# Patient Record
Sex: MALE | Race: WHITE | NOT HISPANIC OR LATINO | Employment: FULL TIME | URBAN - METROPOLITAN AREA
[De-identification: names, ages, dates, MRNs, and addresses within clinical notes are randomized per-mention and may not be internally consistent; named-entity substitution may affect disease eponyms.]

---

## 2017-01-01 ENCOUNTER — TELEPHONE (OUTPATIENT)
Dept: ORTHOPEDIC SURGERY | Facility: CLINIC | Age: 42
End: 2017-01-01

## 2017-01-01 ENCOUNTER — OFFICE VISIT (OUTPATIENT)
Dept: SPORTS MEDICINE | Facility: CLINIC | Age: 42
End: 2017-01-01

## 2017-01-01 ENCOUNTER — TELEPHONE (OUTPATIENT)
Dept: SPORTS MEDICINE | Facility: CLINIC | Age: 42
End: 2017-01-01

## 2017-01-01 VITALS
SYSTOLIC BLOOD PRESSURE: 120 MMHG | HEART RATE: 70 BPM | OXYGEN SATURATION: 98 % | WEIGHT: 214 LBS | HEIGHT: 72 IN | DIASTOLIC BLOOD PRESSURE: 78 MMHG | BODY MASS INDEX: 28.99 KG/M2

## 2017-01-01 VITALS
HEIGHT: 72 IN | HEART RATE: 78 BPM | SYSTOLIC BLOOD PRESSURE: 124 MMHG | WEIGHT: 214 LBS | BODY MASS INDEX: 28.99 KG/M2 | RESPIRATION RATE: 16 BRPM | DIASTOLIC BLOOD PRESSURE: 90 MMHG | OXYGEN SATURATION: 96 %

## 2017-01-01 DIAGNOSIS — M79.672 LEFT FOOT PAIN: ICD-10-CM

## 2017-01-01 DIAGNOSIS — D69.6 THROMBOCYTOPENIA (HCC): Chronic | ICD-10-CM

## 2017-01-01 DIAGNOSIS — K70.10 ALCOHOLIC HEPATITIS WITHOUT ASCITES: Primary | Chronic | ICD-10-CM

## 2017-01-01 DIAGNOSIS — R73.01 ABNORMAL FASTING GLUCOSE: ICD-10-CM

## 2017-01-01 DIAGNOSIS — F10.20 ALCOHOLISM (HCC): ICD-10-CM

## 2017-01-01 DIAGNOSIS — C49.9 SYNOVIAL SARCOMA (HCC): Primary | ICD-10-CM

## 2017-01-01 LAB
ERYTHROCYTE [DISTWIDTH] IN BLOOD BY AUTOMATED COUNT: 21.3 % (ref 11.5–14.5)
HBA1C MFR BLD: 5.3 % (ref 4.8–5.6)
HCT VFR BLD AUTO: 38.4 % (ref 40.4–52.2)
HGB BLD-MCNC: 12 G/DL (ref 13.7–17.6)
MCH RBC QN AUTO: 29.1 PG (ref 27–32.7)
MCHC RBC AUTO-ENTMCNC: 31.3 G/DL (ref 32.6–36.4)
MCV RBC AUTO: 93 FL (ref 79.8–96.2)
PLATELET # BLD AUTO: 332 10*3/MM3 (ref 140–500)
RBC # BLD AUTO: 4.13 10*6/MM3 (ref 4.6–6)
WBC # BLD AUTO: 11.06 10*3/MM3 (ref 4.5–10.7)

## 2017-01-01 PROCEDURE — 99214 OFFICE O/P EST MOD 30 MIN: CPT | Performed by: FAMILY MEDICINE

## 2017-01-01 PROCEDURE — 73630 X-RAY EXAM OF FOOT: CPT | Performed by: FAMILY MEDICINE

## 2017-01-01 RX ORDER — OMEPRAZOLE 20 MG/1
20 TABLET, DELAYED RELEASE ORAL
COMMUNITY
Start: 2014-03-14

## 2017-01-01 RX ORDER — CETIRIZINE HYDROCHLORIDE 10 MG/1
10 TABLET ORAL
COMMUNITY

## 2017-01-01 RX ORDER — OXYCODONE HYDROCHLORIDE AND ACETAMINOPHEN 5; 325 MG/1; MG/1
1-2 TABLET ORAL
COMMUNITY
Start: 2017-01-01

## 2017-01-01 RX ORDER — HYDROXYZINE PAMOATE 50 MG/1
CAPSULE ORAL
COMMUNITY
Start: 2017-01-01

## 2017-01-01 RX ORDER — TRAZODONE HYDROCHLORIDE 50 MG/1
TABLET ORAL
COMMUNITY
Start: 2017-01-01

## 2017-01-01 RX ORDER — FLUOXETINE HYDROCHLORIDE 40 MG/1
CAPSULE ORAL
COMMUNITY
Start: 2017-01-01

## 2017-02-15 PROBLEM — K21.9 GASTROESOPHAGEAL REFLUX DISEASE: Status: ACTIVE | Noted: 2017-01-01

## 2017-02-15 NOTE — PROGRESS NOTES
"Devon is a 41 y.o. year old male    Chief Complaint   Patient presents with   • Establish Care     had outside labs done [facility] had red flags       History of Present Illness  Here to establish care. 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 Has been in detox 3 times, most recently at [facility] over the past month. He sees Dr. Cat (sp), psychiatry and recently started seeing counselor. Has been going to AA meetings and has a sponsor. Supportive wife. Had blood testing performed prior to leaving [facility] which was abnormal and brought results with him. I have reviewed results and they are scanned in to computer.    H/o thrombocytopenia: I have reviewed previous tests through EMR and Care Everywhere. Platelets 12/30/2016 were 104. Has drank EtOH for 20+ years. As far as he can remember, thinks he bleeds easily, specifically if he cuts himself shaving.    L foot pain: x months. No known trauma. Has noticed swollen area on bottom of foot. Painful if he has poor fitting shoes or walking his dog for long periods of time.    I have reviewed the patient's medical history in detail and updated the computerized patient record.    Review of Systems   Constitutional: Negative for chills, fatigue and fever.   HENT: Negative for postnasal drip and sore throat.    Eyes: Negative for pain.   Respiratory: Negative for cough, chest tightness and wheezing.    Cardiovascular: Negative for chest pain.   Gastrointestinal: Negative.    Musculoskeletal: Positive for arthralgias (L foot) and gait problem. Negative for myalgias.   Skin: Negative for rash.   Neurological: Negative for numbness and headaches.   Psychiatric/Behavioral: Negative.    All other systems reviewed and are negative.      Visit Vitals   • /90 (BP Location: Left arm, Patient Position: Sitting, Cuff Size: Large Adult)   • Pulse 78   • Resp 16   • Ht 72\" (182.9 cm)   • Wt 214 lb (97.1 kg)   • SpO2 96%   • BMI 29.02 kg/m2        Physical Exam   Constitutional: " He is oriented to person, place, and time. He appears well-developed and well-nourished.   HENT:   Head: Normocephalic and atraumatic.   Right Ear: External ear normal.   Left Ear: External ear normal.   Nose: Nose normal.   Mouth/Throat: Oropharynx is clear and moist.   Eyes: EOM are normal.   Neck: Normal range of motion.   Cardiovascular: Normal rate and regular rhythm.    Pulmonary/Chest: Effort normal and breath sounds normal.   Abdominal: Soft. Normal appearance and bowel sounds are normal. There is no tenderness.   Musculoskeletal:   R foot: full ROM  L foot: tenderness to palpation along midsubstance of plantar fascia with associated soft tissue swelling   Neurological: He is alert and oriented to person, place, and time.   Skin: Skin is warm and dry. No rash noted.   Psychiatric: He has a normal mood and affect. His behavior is normal.   Nursing note and vitals reviewed.    Left Foot X-Ray  Indication: Pain  AP, Lateral, and Oblique views    Findings:  No fracture  No bony lesion  Soft tissue edema seen on lateral view along midsubstance of plantar fascia  Normal joint spaces    No prior studies were available for comparison.    Outside labs reviewed and scanned: 2/7/17  Glucose 109    Platelets 99     Diagnoses and all orders for this visit:    Alcoholic hepatitis without ascites    Thrombocytopenia  -     CBC No Differential    Abnormal fasting glucose  -     Hemoglobin A1c    Left foot pain  -     XR Foot 3+ View Left    Other orders  -     cetirizine (zyrTEC) 10 MG tablet; Take 10 mg by mouth.  -     omeprazole OTC (PriLOSEC OTC) 20 MG EC tablet; Take 20 mg by mouth.  -     FLUoxetine (PROzac) 40 MG capsule;   -     hydrOXYzine (VISTARIL) 50 MG capsule;   -     traZODone (DESYREL) 50 MG tablet;     1. Stable for now. Has been sober for 13 days. Sees psychiatry, counselor and is attending AA meetings. GGT elevated on recent blood work but other LFTs WNL. May repeat GGT at follow up.  2. 2/2 #1. No  active bleeding. Will repeat CBC today. Baseline could be around 100 due to EtOHism.  3. Blood glucose 109 on 2/7/17 when d/c from [facility]. Fam h/o DM. Check a1c today.  4. Chronic. Could be flexor tenosynovitis but given size of swelling, my recommendation is to see one of our orthopedist who works on feet regularly. Referral made. Samples of Pennsaid given prior to leaving office.

## 2017-02-20 NOTE — TELEPHONE ENCOUNTER
As long as no sign of infection, ( redness, drainage, fever) and it is a chronic problem, I can see on thursday

## 2017-04-19 NOTE — TELEPHONE ENCOUNTER
I had sent him to an orthopedist at last visit to discuss foot - not sure why an oncologist would be looking at his foot. As for medication, I can send him an anti-inflammatory medication. Please call in diclofenac 75mg 1 tab BID dispense #60 no refi  lls to his pharmacy. I have not seen report from orthopedist.

## 2017-04-19 NOTE — TELEPHONE ENCOUNTER
Pt called and states he was seen by an Oncologist for a lump on his foot. He states the oncologist was sending a report over to you. He wants to know if there is a pain medication that can be given for his pain in his foot.

## 2017-05-17 PROBLEM — C49.9 SYNOVIAL SARCOMA (HCC): Status: ACTIVE | Noted: 2017-01-01
